# Patient Record
Sex: FEMALE | Race: WHITE | NOT HISPANIC OR LATINO | URBAN - METROPOLITAN AREA
[De-identification: names, ages, dates, MRNs, and addresses within clinical notes are randomized per-mention and may not be internally consistent; named-entity substitution may affect disease eponyms.]

---

## 2021-07-21 ENCOUNTER — OFFICE VISIT (OUTPATIENT)
Dept: URBAN - METROPOLITAN AREA CLINIC 12 | Facility: CLINIC | Age: 41
End: 2021-07-21
Payer: COMMERCIAL

## 2021-07-21 DIAGNOSIS — R19.7 DIARRHEA: ICD-10-CM

## 2021-07-21 PROCEDURE — 99203 OFFICE O/P NEW LOW 30 MIN: CPT | Performed by: INTERNAL MEDICINE

## 2021-07-21 NOTE — HPI-TODAY'S VISIT:
40 yo female presenting for second opinion had recurrent  c diff recently --reports that she has had recurrent c diff diarrhea- originally started on Sept 29. .  initial c diff negative but sx continued.  then was seen at [facility]. by that time having worsening diarrhea/pain.  had repeat testing and c diff positive.  had initial course of vancomycin, sx continued, extended and then tapers through the holidays.  she feels thta things were better in January.  within 5 days of tapering she was back to having sx.  positive C diff.  placed back on vancomycin which wasn't working.  changed to dificid- did 10 day course of this.  finally went for FMT on May 24th [facility].   -she has had two negative c diff tests , last in the past month.   -states that she is feeling that she has ongoing diarrhea though- has only had one firm stool since that time.  only had that since having the immodium.   -she has 3-4 watery bowel movements a day, mild cramping this is better.  no blood in the stool.  no nighttime bowle movements. -she has cut out certain foods from her diet -she is not taking probiotics -complains of a lot of bloating.  she didn't necessarily feel improvement with florastor   -doesn't take immodium because she is trying not to -she feels that her appetite and diet have been good. her weight has come up a little bit now that she is not as sicks.   -denies any family history of intestinal issues at all

## 2021-09-15 ENCOUNTER — OFFICE VISIT (OUTPATIENT)
Dept: URBAN - METROPOLITAN AREA CLINIC 12 | Facility: CLINIC | Age: 41
End: 2021-09-15

## 2021-10-13 ENCOUNTER — WEB ENCOUNTER (OUTPATIENT)
Dept: URBAN - METROPOLITAN AREA CLINIC 12 | Facility: CLINIC | Age: 41
End: 2021-10-13

## 2021-10-13 RX ORDER — CHOLESTYRAMINE 4 G/9G
1 PACKET MIXED WITH WATER OR NON-CARBONATED DRINK POWDER, FOR SUSPENSION ORAL TWICE A DAY
Qty: 60 | Refills: 3 | OUTPATIENT
Start: 2021-10-13

## 2021-10-27 ENCOUNTER — DASHBOARD ENCOUNTERS (OUTPATIENT)
Age: 41
End: 2021-10-27

## 2021-10-27 ENCOUNTER — OFFICE VISIT (OUTPATIENT)
Dept: URBAN - METROPOLITAN AREA CLINIC 12 | Facility: CLINIC | Age: 41
End: 2021-10-27
Payer: COMMERCIAL

## 2021-10-27 VITALS
BODY MASS INDEX: 24.39 KG/M2 | SYSTOLIC BLOOD PRESSURE: 125 MMHG | HEIGHT: 60 IN | TEMPERATURE: 96.9 F | HEART RATE: 76 BPM | DIASTOLIC BLOOD PRESSURE: 82 MMHG | WEIGHT: 124.2 LBS

## 2021-10-27 DIAGNOSIS — R19.7 DIARRHEA: ICD-10-CM

## 2021-10-27 PROCEDURE — 99214 OFFICE O/P EST MOD 30 MIN: CPT | Performed by: INTERNAL MEDICINE

## 2021-10-27 RX ORDER — CHOLESTYRAMINE 4 G/9G
1 PACKET MIXED WITH WATER OR NON-CARBONATED DRINK POWDER, FOR SUSPENSION ORAL TWICE A DAY
Qty: 60 | Refills: 3 | Status: ACTIVE | COMMUNITY
Start: 2021-10-13

## 2021-10-27 NOTE — HPI-TODAY'S VISIT:
42 yo female presenting for second opinion had recurrent  c diff recently --reports that she has had recurrent c diff diarrhea- originally started on Sept 29. .  initial c diff negative but sx continued.  then was seen at [facility]. by that time having worsening diarrhea/pain.  had repeat testing and c diff positive.  had initial course of vancomycin, sx continued, extended and then tapers through the holidays.  she feels thta things were better in January.  within 5 days of tapering she was back to having sx.  positive C diff.  placed back on vancomycin which wasn't working.  changed to dificid- did 10 day course of this.  finally went for FMT on May 24th [facility].   -she has had two negative c diff tests , last in the past month.   -states that she is feeling that she has ongoing diarrhea though- has only had one firm stool since that time.  only had that since having the immodium.   -she has 3-4 watery bowel movements a day, mild cramping this is better.  no blood in the stool.  no nighttime bowle movements. -she has cut out certain foods from her diet -she is not taking probiotics -complains of a lot of bloating.  she didn't necessarily feel improvement with florastor   -doesn't take immodium because she is trying not to -she feels that her appetite and diet have been good. her weight has come up a little bit now that she is not as sicks.   -denies any family history of intestinal issues at all ============================================================================= 10/27/2021 she states that she feels that the medication- cholestyramine- has helped somewhat to decrease the number and helped with consistancy.  she feels anay tthere is some pain and cramping with the bowel movements.  she feels that it is still not completely resolved.  no blood in the stool she states that her appetite has been good, she feels that her weight has been stable has been avoiding spicy foods.  she states that her weight has been in the same range -she is not using any medication for the cramping and discomfort -the cramping is only when she is going to the bathroom.  has a lot of gas and bloating as well -she is using the florastor as well -she is taking the cholestyramine daily -there are no other food removals that she has done- avoiding spicy food.   -she doesn't take in a lot of dairy -she has had celiac test previously which was negative

## 2021-10-28 ENCOUNTER — WEB ENCOUNTER (OUTPATIENT)
Dept: URBAN - METROPOLITAN AREA CLINIC 12 | Facility: CLINIC | Age: 41
End: 2021-10-28

## 2021-11-10 ENCOUNTER — OFFICE VISIT (OUTPATIENT)
Dept: URBAN - METROPOLITAN AREA CLINIC 118 | Facility: CLINIC | Age: 41
End: 2021-11-10

## 2021-11-19 ENCOUNTER — WEB ENCOUNTER (OUTPATIENT)
Dept: URBAN - METROPOLITAN AREA CLINIC 12 | Facility: CLINIC | Age: 41
End: 2021-11-19

## 2021-11-19 LAB
CALPROTECTIN, STOOL - QDX: (no result)
FECAL FAT, QUALITATIVE: (no result)
GASTROINTESTINAL PATHOGEN: (no result)
LACTOFERRIN SCAN (QUANTITATIVE) - QDX: NEGATIVE
PANCREATICELASTASE ELISA, STOOL: (no result)
STOOL, WHITE BLOOD CELLS: (no result)

## 2021-12-28 ENCOUNTER — OFFICE VISIT (OUTPATIENT)
Dept: URBAN - METROPOLITAN AREA SURGERY CENTER 15 | Facility: SURGERY CENTER | Age: 41
End: 2021-12-28

## 2022-02-21 ENCOUNTER — WEB ENCOUNTER (OUTPATIENT)
Dept: URBAN - METROPOLITAN AREA CLINIC 12 | Facility: CLINIC | Age: 42
End: 2022-02-21

## 2022-02-21 RX ORDER — CHOLESTYRAMINE 4 G/9G
1 PACKET MIXED WITH WATER OR NON-CARBONATED DRINK POWDER, FOR SUSPENSION ORAL TWICE A DAY
Qty: 60 | Refills: 3
Start: 2021-10-13

## 2022-07-26 ENCOUNTER — WEB ENCOUNTER (OUTPATIENT)
Dept: URBAN - METROPOLITAN AREA CLINIC 12 | Facility: CLINIC | Age: 42
End: 2022-07-26

## 2022-07-26 RX ORDER — CHOLESTYRAMINE 4 G/9G
1 PACKET MIXED WITH WATER OR NON-CARBONATED DRINK POWDER, FOR SUSPENSION ORAL TWICE A DAY
Qty: 60 | Refills: 3
Start: 2021-10-13

## 2022-07-27 ENCOUNTER — WEB ENCOUNTER (OUTPATIENT)
Dept: URBAN - METROPOLITAN AREA CLINIC 12 | Facility: CLINIC | Age: 42
End: 2022-07-27

## 2022-07-27 RX ORDER — CHOLESTYRAMINE 4 G/9G
1 PACKET MIXED WITH WATER OR NON-CARBONATED DRINK POWDER, FOR SUSPENSION ORAL TWICE A DAY
Qty: 60 | Refills: 1
Start: 2021-10-13

## 2022-07-27 RX ORDER — CHOLESTYRAMINE 4 G/9G
1 PACKET MIXED WITH WATER OR NON-CARBONATED DRINK POWDER, FOR SUSPENSION ORAL TWICE A DAY
Qty: 60 | Refills: 3
Start: 2021-10-13

## 2022-09-21 ENCOUNTER — OFFICE VISIT (OUTPATIENT)
Dept: URBAN - METROPOLITAN AREA CLINIC 12 | Facility: CLINIC | Age: 42
End: 2022-09-21